# Patient Record
(demographics unavailable — no encounter records)

---

## 2025-04-16 NOTE — IMAGING
[de-identified] : RIGHT HAND skin intact. moderate swelling of SF PIPJ - worse radial. no TTP. SF: PIPJ 35deg flexion contracture, otherwise good extension. PIPJ limited flexion, DIPJ mild limited flexion. composite flexion ~1cm to mid-palm. no scissoring. good EPL, FPL. other fingers good extension, flex to full fist. good finger abduction, adduction. SILT to median, ulnar, radial distribution. brisk cap refill all digits. no triggering.  SF PIPJ: mild increased opening but good endpoint with ulnar directed stress.  @outside imaging 3/5/25 XRAYS OF RIGHT SMALL FINGER (3 views - PA, OBLIQUE, AND LATERAL VIEWS): no acute displaced fracture or dislocation.

## 2025-04-16 NOTE — ASSESSMENT
[FreeTextEntry1] : I independently reviewed and interpreted outside @3/5/25 XRAYS OF RIGHT SMALL FINGER (3 views - PA, OBLIQUE, AND LATERAL VIEWS): no acute displaced fracture or dislocation.  The condition was explained to the patient. Discussed risks and benefits of surgical vs non-surgical treatment.   I explained that sprains / ligament injuries can take 1-2 years for maximal healing. Recommend a course of conservative treatment. Discussed risk of persistent pain, stiffness, instability, post-traumatic arthritis. Anticipate 1 year for swelling to stabilize, may have permanent enlargement of joint. Patient expressed understanding and is in agreement with treatment plan.   Discussed my concern that hand stiffness can cause grave dysfunction and is difficult to overcome once it has set in. - prescribed OT for hand. - provided handout on and demonstrated HEP for finger flexion and extension, 5x/day.   F/u 6 weeks.
Applied

## 2025-04-16 NOTE — HISTORY OF PRESENT ILLNESS
[de-identified] : 4/16/25: 25yo male (RHD. [employer] admissions) presents for RIGHT small finger. Reports that he was playing basketball on 2/20/25, the ball struck his finger, dislocating the finger, and he "pulled on the finger and popped it back into place." Went to  in [place] on 3/5/25 => XR, finger splint. Stopped wearing splint a few days ago. c/o pain if he accidentally bangs it. c/o persistent swelling and finger flexion contracture.  Hx: none. [FreeTextEntry5] : LITZY DECKERANGEL shannon [RHD] 24 year old male is here today for evaluation of RIGHT small finger after dislocation on 02/20/25. states he popped finger back into place. went to [facility] urgent care in [place] on 03/05/25 +xrays and finger splint. since DOI, swelling in PIP joint is constant and pt is unable to straighten finger.